# Patient Record
Sex: FEMALE | Race: WHITE | URBAN - METROPOLITAN AREA
[De-identification: names, ages, dates, MRNs, and addresses within clinical notes are randomized per-mention and may not be internally consistent; named-entity substitution may affect disease eponyms.]

---

## 2022-07-07 ENCOUNTER — OFFICE VISIT (OUTPATIENT)
Dept: FAMILY MEDICINE CLINIC | Age: 42
End: 2022-07-07
Payer: COMMERCIAL

## 2022-07-07 VITALS
HEIGHT: 58 IN | SYSTOLIC BLOOD PRESSURE: 128 MMHG | RESPIRATION RATE: 14 BRPM | HEART RATE: 75 BPM | TEMPERATURE: 98.5 F | WEIGHT: 112 LBS | DIASTOLIC BLOOD PRESSURE: 72 MMHG | OXYGEN SATURATION: 98 % | BODY MASS INDEX: 23.51 KG/M2

## 2022-07-07 DIAGNOSIS — Z00.00 WELL ADULT EXAM: Primary | ICD-10-CM

## 2022-07-07 DIAGNOSIS — Z00.00 WELL ADULT EXAM: ICD-10-CM

## 2022-07-07 DIAGNOSIS — E55.9 VITAMIN D DEFICIENCY: ICD-10-CM

## 2022-07-07 DIAGNOSIS — I10 PRIMARY HYPERTENSION: ICD-10-CM

## 2022-07-07 DIAGNOSIS — G35 MS (MULTIPLE SCLEROSIS) (HCC): ICD-10-CM

## 2022-07-07 PROBLEM — D89.89 OTHER SPECIFIED DISORDERS INVOLVING THE IMMUNE MECHANISM, NOT ELSEWHERE CLASSIFIED (HCC): Status: ACTIVE | Noted: 2021-08-26

## 2022-07-07 PROBLEM — H53.9 VISUAL CHANGES: Status: ACTIVE | Noted: 2017-03-30

## 2022-07-07 PROBLEM — R27.8 ABNORMAL COORDINATION: Status: ACTIVE | Noted: 2018-01-22

## 2022-07-07 PROBLEM — R27.8 ABNORMAL COORDINATION: Status: RESOLVED | Noted: 2018-01-22 | Resolved: 2022-07-07

## 2022-07-07 PROBLEM — H53.9 VISUAL CHANGES: Status: RESOLVED | Noted: 2017-03-30 | Resolved: 2022-07-07

## 2022-07-07 LAB
A/G RATIO: 2.3 (ref 1.1–2.2)
ALBUMIN SERPL-MCNC: 5.2 G/DL (ref 3.4–5)
ALP BLD-CCNC: 107 U/L (ref 40–129)
ALT SERPL-CCNC: 15 U/L (ref 10–40)
ANION GAP SERPL CALCULATED.3IONS-SCNC: 17 MMOL/L (ref 3–16)
AST SERPL-CCNC: 21 U/L (ref 15–37)
BILIRUB SERPL-MCNC: 0.4 MG/DL (ref 0–1)
BUN BLDV-MCNC: 9 MG/DL (ref 7–20)
CALCIUM SERPL-MCNC: 10.1 MG/DL (ref 8.3–10.6)
CHLORIDE BLD-SCNC: 100 MMOL/L (ref 99–110)
CHOLESTEROL, TOTAL: 203 MG/DL (ref 0–199)
CO2: 23 MMOL/L (ref 21–32)
CREAT SERPL-MCNC: 0.7 MG/DL (ref 0.6–1.1)
GFR AFRICAN AMERICAN: >60
GFR NON-AFRICAN AMERICAN: >60
GLUCOSE BLD-MCNC: 85 MG/DL (ref 70–99)
HDLC SERPL-MCNC: 63 MG/DL (ref 40–60)
LDL CHOLESTEROL CALCULATED: 127 MG/DL
POTASSIUM SERPL-SCNC: 4.2 MMOL/L (ref 3.5–5.1)
SODIUM BLD-SCNC: 140 MMOL/L (ref 136–145)
TOTAL PROTEIN: 7.5 G/DL (ref 6.4–8.2)
TRIGL SERPL-MCNC: 63 MG/DL (ref 0–150)
TSH REFLEX: 1.94 UIU/ML (ref 0.27–4.2)
VLDLC SERPL CALC-MCNC: 13 MG/DL

## 2022-07-07 PROCEDURE — 99385 PREV VISIT NEW AGE 18-39: CPT | Performed by: NURSE PRACTITIONER

## 2022-07-07 RX ORDER — NADOLOL 40 MG/1
40 TABLET ORAL NIGHTLY
COMMUNITY
Start: 2021-08-09 | End: 2022-07-07 | Stop reason: SDUPTHER

## 2022-07-07 RX ORDER — GABAPENTIN 300 MG/1
CAPSULE ORAL
COMMUNITY
Start: 2022-06-08 | End: 2022-07-07

## 2022-07-07 RX ORDER — GABAPENTIN 300 MG/1
CAPSULE ORAL
COMMUNITY
Start: 2022-06-21 | End: 2022-07-07

## 2022-07-07 RX ORDER — NADOLOL 40 MG/1
40 TABLET ORAL NIGHTLY
Qty: 90 TABLET | Refills: 1 | Status: SHIPPED | OUTPATIENT
Start: 2022-07-07

## 2022-07-07 RX ORDER — NORTRIPTYLINE HYDROCHLORIDE 75 MG/1
CAPSULE ORAL
COMMUNITY
Start: 2022-04-27

## 2022-07-07 RX ORDER — GABAPENTIN 600 MG/1
TABLET ORAL
COMMUNITY

## 2022-07-07 SDOH — HEALTH STABILITY: PHYSICAL HEALTH: ON AVERAGE, HOW MANY MINUTES DO YOU ENGAGE IN EXERCISE AT THIS LEVEL?: 30 MIN

## 2022-07-07 SDOH — HEALTH STABILITY: PHYSICAL HEALTH: ON AVERAGE, HOW MANY DAYS PER WEEK DO YOU ENGAGE IN MODERATE TO STRENUOUS EXERCISE (LIKE A BRISK WALK)?: 3 DAYS

## 2022-07-07 ASSESSMENT — PATIENT HEALTH QUESTIONNAIRE - PHQ9
2. FEELING DOWN, DEPRESSED OR HOPELESS: 0
SUM OF ALL RESPONSES TO PHQ9 QUESTIONS 1 & 2: 0
SUM OF ALL RESPONSES TO PHQ QUESTIONS 1-9: 0
1. LITTLE INTEREST OR PLEASURE IN DOING THINGS: 0
SUM OF ALL RESPONSES TO PHQ QUESTIONS 1-9: 0

## 2022-07-07 ASSESSMENT — SOCIAL DETERMINANTS OF HEALTH (SDOH)
WITHIN THE LAST YEAR, HAVE YOU BEEN KICKED, HIT, SLAPPED, OR OTHERWISE PHYSICALLY HURT BY YOUR PARTNER OR EX-PARTNER?: NO
WITHIN THE LAST YEAR, HAVE YOU BEEN AFRAID OF YOUR PARTNER OR EX-PARTNER?: NO
WITHIN THE LAST YEAR, HAVE YOU BEEN HUMILIATED OR EMOTIONALLY ABUSED IN OTHER WAYS BY YOUR PARTNER OR EX-PARTNER?: NO
WITHIN THE LAST YEAR, HAVE TO BEEN RAPED OR FORCED TO HAVE ANY KIND OF SEXUAL ACTIVITY BY YOUR PARTNER OR EX-PARTNER?: NO

## 2022-07-07 ASSESSMENT — ENCOUNTER SYMPTOMS
DIARRHEA: 0
CONSTIPATION: 0
VOMITING: 0
SHORTNESS OF BREATH: 0
NAUSEA: 0
CHEST TIGHTNESS: 0

## 2022-07-07 NOTE — PROGRESS NOTES
2022    Bonnie Philippe (:  1980) is a 46 y.o. female, here for evaluation of the following medical concerns:  Chief Complaint   Patient presents with    New Patient     needs bp meds       HPI  Patient is here for a new patient visit. Previous PCP was in [place], [place]. Moved to [address] [place] [address]. MS- on ocrelizumab infusion every 6 months. Following with neurologist Dr. Alcides Nicolas in [facility]. Dx 2017 with optic neuritis x3 episodes. Has facial nerve pain and taking nortriptyline 75mg qhs and gabapentin 600 mg BID. HTN- on nadolol 40 mg daily. Tolerating medication well. Monitors BP at home and runs 120-130/80s. Dx Oct 2016. GYN- Follows with GYN Dr. Edita Bradley. Has mirena IUD that was placed about a year ago. Does not have a period with the IUD. Had mammogram last month that was normal. Completed at [facility]' office. Has daughter who is transitioning to being a male. Has been on testosterone for the past year. Just turned 22. Works in customer service. Exercise- nothing specific- tries to walk multiple times per week   Diet- nothing specific. Reports that she does not eat much. Review of Systems   Constitutional: Negative for activity change and fatigue. Respiratory: Negative for chest tightness and shortness of breath. Cardiovascular: Negative for chest pain. Gastrointestinal: Negative for constipation, diarrhea, nausea and vomiting. Neurological: Negative for dizziness, syncope, weakness and headaches. Psychiatric/Behavioral: Negative for confusion. Prior to Visit Medications    Medication Sig Taking?  Authorizing Provider   sodium chloride 0.9 % SOLN 250 mL with ocrelizumab 300 MG/10ML SOLN 300 mg Infuse intravenously Yes Historical Provider, MD   vitamin D3 (CHOLECALCIFEROL) 125 MCG (5000 UT) TABS tablet Take 5,000 Units by mouth three times a week  Yes Historical Provider, MD   cyanocobalamin 1000 MCG tablet Take 1,000 mcg by mouth daily Yes Historical Provider, MD   gabapentin (NEURONTIN) 600 MG tablet gabapentin 600 mg tablet Yes Historical Provider, MD   levonorgestrel (MIRENA) IUD 52 mg 1 each by IntraUTERine route once Yes Historical Provider, MD   nadolol (CORGARD) 40 MG tablet Take 40 mg by mouth nightly Yes Historical Provider, MD   nortriptyline (PAMELOR) 75 MG capsule  Yes Historical Provider, MD   CALCIUM PO Take 1 capsule by mouth daily Yes Historical Provider, MD   Multiple Vitamin (MULTI-VITAMIN DAILY PO) Take 1 tablet by mouth daily Yes Historical Provider, MD        No Known Allergies    Past Medical History:   Diagnosis Date    MS (multiple sclerosis) ([facility] [address]     Primary hypertension        History reviewed. No pertinent surgical history. Social History     Socioeconomic History    Marital status: Single     Spouse name: Not on file    Number of children: 1    Years of education: Not on file    Highest education level: Not on file   Occupational History    Not on file   Tobacco Use    Smoking status: Never Smoker    Smokeless tobacco: Never Used   Vaping Use    Vaping Use: Never used   Substance and Sexual Activity    Alcohol use: Yes     Comment: couple glasses of wine per week    Drug use: Never    Sexual activity: Yes       Family History   Problem Relation Age of Onset    Hypertension Father     Breast Cancer Maternal Grandmother     Prostate Cancer Maternal Grandfather     Hypertension Paternal Grandmother     Heart Disease Paternal Grandfather        Vitals:    07/07/22 1411   BP: 128/72   Site: Right Upper Arm   Position: Sitting   Cuff Size: Medium Adult   Pulse: 75   Resp: 14   Temp: 98.5 °F (36.9 °C)   TempSrc: Oral   SpO2: 98%   Weight: 112 lb (50.8 kg)   Height: 4' 10.27\" (1.48 m)     Estimated body mass index is 23.19 kg/m² as calculated from the following:    Height as of this encounter: 4' 10.27\" (1.48 m). Weight as of this encounter: 112 lb (50.8 kg).     Physical Exam  Vitals and nursing note reviewed. Constitutional:       General: She is not in acute distress. Appearance: She is well-developed. HENT:      Head: Normocephalic and atraumatic. Right Ear: Tympanic membrane, ear canal and external ear normal.      Left Ear: Tympanic membrane, ear canal and external ear normal.   Neck:      Thyroid: No thyromegaly. Cardiovascular:      Rate and Rhythm: Normal rate and regular rhythm. Heart sounds: Normal heart sounds. No murmur heard. No friction rub. No gallop. Pulmonary:      Effort: Pulmonary effort is normal. No respiratory distress. Breath sounds: Normal breath sounds. Abdominal:      Palpations: Abdomen is soft. Tenderness: There is no abdominal tenderness. Musculoskeletal:      Cervical back: Neck supple. Right lower leg: No edema. Left lower leg: No edema. Lymphadenopathy:      Cervical: No cervical adenopathy. Skin:     General: Skin is warm and dry. Neurological:      Mental Status: She is alert and oriented to person, place, and time. Psychiatric:         Behavior: Behavior normal.         Thought Content: Thought content normal.         Judgment: Judgment normal.         ASSESSMENT/PLAN:  1. Well adult exam  - Comprehensive Metabolic Panel; Future  - TSH with Reflex; Future  - Lipid Panel; Future    Healthy lifestyles reviewed: diet, aerobic exercise, sunscreen, vision and dental exams. Declines vaccines. GYN- continue f/u with Dr. Sima Telles. Had mammogram last month at [facility]' office- will try to obtain results. She reports that they were normal.     2. Primary hypertension  Controlled   - nadolol (CORGARD) 40 MG tablet; Take 1 tablet by mouth nightly  Dispense: 90 tablet; Refill: 1  - Comprehensive Metabolic Panel; Future  - Lipid Panel; Future    3. MS (multiple sclerosis) (HCC)  Stable. Follows with neurologist Dr. Sharlene Srinivasan in [place], [place]. Discussed transferring care to [facility] neurology. She will consider.      4. Vitamin D deficiency  On vitamin D supplement. Level monitored by neurologist.       Return in about 6 months (around 1/7/2023), or if symptoms worsen or fail to improve, for chronic conditions. An  electronic signature was used to authenticate this note.     --PRUDENCIO Johnson - CNP on 7/7/2022 at 2:15 PM

## 2023-01-03 DIAGNOSIS — I10 PRIMARY HYPERTENSION: ICD-10-CM

## 2023-01-03 RX ORDER — NADOLOL 40 MG/1
TABLET ORAL
Qty: 90 TABLET | Refills: 0 | Status: SHIPPED | OUTPATIENT
Start: 2023-01-03

## 2023-01-12 ENCOUNTER — OFFICE VISIT (OUTPATIENT)
Dept: FAMILY MEDICINE CLINIC | Age: 43
End: 2023-01-12
Payer: COMMERCIAL

## 2023-01-12 VITALS
BODY MASS INDEX: 23.72 KG/M2 | TEMPERATURE: 98.3 F | DIASTOLIC BLOOD PRESSURE: 84 MMHG | HEART RATE: 76 BPM | OXYGEN SATURATION: 96 % | HEIGHT: 58 IN | WEIGHT: 113 LBS | SYSTOLIC BLOOD PRESSURE: 114 MMHG

## 2023-01-12 DIAGNOSIS — G35 MS (MULTIPLE SCLEROSIS) (HCC): ICD-10-CM

## 2023-01-12 DIAGNOSIS — I10 PRIMARY HYPERTENSION: Primary | ICD-10-CM

## 2023-01-12 PROCEDURE — 99213 OFFICE O/P EST LOW 20 MIN: CPT | Performed by: NURSE PRACTITIONER

## 2023-01-12 PROCEDURE — 3079F DIAST BP 80-89 MM HG: CPT | Performed by: NURSE PRACTITIONER

## 2023-01-12 PROCEDURE — 3074F SYST BP LT 130 MM HG: CPT | Performed by: NURSE PRACTITIONER

## 2023-01-12 SDOH — ECONOMIC STABILITY: FOOD INSECURITY: WITHIN THE PAST 12 MONTHS, THE FOOD YOU BOUGHT JUST DIDN'T LAST AND YOU DIDN'T HAVE MONEY TO GET MORE.: NEVER TRUE

## 2023-01-12 SDOH — ECONOMIC STABILITY: FOOD INSECURITY: WITHIN THE PAST 12 MONTHS, YOU WORRIED THAT YOUR FOOD WOULD RUN OUT BEFORE YOU GOT MONEY TO BUY MORE.: NEVER TRUE

## 2023-01-12 ASSESSMENT — ENCOUNTER SYMPTOMS
SHORTNESS OF BREATH: 0
ABDOMINAL PAIN: 0
COUGH: 0

## 2023-01-12 ASSESSMENT — PATIENT HEALTH QUESTIONNAIRE - PHQ9
SUM OF ALL RESPONSES TO PHQ QUESTIONS 1-9: 0
SUM OF ALL RESPONSES TO PHQ9 QUESTIONS 1 & 2: 0
SUM OF ALL RESPONSES TO PHQ QUESTIONS 1-9: 0
2. FEELING DOWN, DEPRESSED OR HOPELESS: 0
SUM OF ALL RESPONSES TO PHQ QUESTIONS 1-9: 0
SUM OF ALL RESPONSES TO PHQ QUESTIONS 1-9: 0
1. LITTLE INTEREST OR PLEASURE IN DOING THINGS: 0

## 2023-01-12 ASSESSMENT — SOCIAL DETERMINANTS OF HEALTH (SDOH): HOW HARD IS IT FOR YOU TO PAY FOR THE VERY BASICS LIKE FOOD, HOUSING, MEDICAL CARE, AND HEATING?: NOT HARD AT ALL

## 2023-01-12 NOTE — PROGRESS NOTES
1/12/2023    This is a 38 y.o. female   Chief Complaint   Patient presents with    Hypertension     6 m f/u BP check   . HPI    Hypertension:  Home blood pressure monitoring: No.  She is adherent to a low sodium diet. Patient denies chest pain, shortness of breath, headache, palpitations, and dry cough. Antihypertensive medication side effects: no medication side effects noted. No results found for: LABA1C  Lab Results   Component Value Date    CREATININE 0.7 07/07/2022     Lab Results   Component Value Date    ALT 15 07/07/2022    AST 21 07/07/2022     Lab Results   Component Value Date    CHOL 203 (H) 07/07/2022    TRIG 63 07/07/2022    HDL 63 (H) 07/07/2022    LDLCALC 127 (H) 07/07/2022        MS- on ocrelizumab infusion every 6 months. Following with neurologist Dr. Monty Hunter in [place]. Dx 2/2017 with optic neuritis x3 episodes. Has facial nerve pain and taking nortriptyline 75mg qhs and gabapentin 600 mg BID. Patient Active Problem List   Diagnosis    Vitamin D deficiency    Primary hypertension    MS (multiple sclerosis) ([facility] [address]- follows with neurologist Dr. Monty Hunter in [place], [place]          Current Outpatient Medications   Medication Sig Dispense Refill    nadolol (CORGARD) 40 MG tablet TAKE 1 TABLET NIGHTLY 90 tablet 0    sodium chloride 0.9 % SOLN 250 mL with ocrelizumab 300 MG/10ML SOLN 300 mg Infuse intravenously      vitamin D3 (CHOLECALCIFEROL) 125 MCG (5000 UT) TABS tablet Take 5,000 Units by mouth three times a week       cyanocobalamin 1000 MCG tablet Take 1,000 mcg by mouth daily      gabapentin (NEURONTIN) 600 MG tablet gabapentin 600 mg tablet      levonorgestrel (MIRENA) IUD 52 mg 1 each by IntraUTERine route once      nortriptyline (PAMELOR) 75 MG capsule       CALCIUM PO Take 1 capsule by mouth daily      Multiple Vitamin (MULTI-VITAMIN DAILY PO) Take 1 tablet by mouth daily       No current facility-administered medications for this visit.        No Known Allergies    Review of Systems   Constitutional:  Negative for activity change and fever. Respiratory:  Negative for cough and shortness of breath. Cardiovascular:  Negative for chest pain. Gastrointestinal:  Negative for abdominal pain. Neurological:  Negative for dizziness, syncope, weakness and headaches. Vitals:    01/12/23 0741   BP: 114/84   Site: Left Upper Arm   Position: Sitting   Cuff Size: Medium Adult   Pulse: 76   Temp: 98.3 °F (36.8 °C)   TempSrc: Oral   SpO2: 96%   Weight: 113 lb (51.3 kg)   Height: 4' 10.25\" (1.48 m)       Body mass index is 23.41 kg/m². Wt Readings from Last 3 Encounters:   01/12/23 113 lb (51.3 kg)   07/07/22 112 lb (50.8 kg)       BP Readings from Last 3 Encounters:   01/12/23 114/84   07/07/22 128/72       Physical Exam  Vitals and nursing note reviewed. Constitutional:       General: She is not in acute distress. Appearance: She is well-developed. HENT:      Head: Normocephalic and atraumatic. Cardiovascular:      Rate and Rhythm: Normal rate and regular rhythm. Heart sounds: Normal heart sounds. No murmur heard. No friction rub. No gallop. Pulmonary:      Effort: Pulmonary effort is normal. No respiratory distress. Breath sounds: Normal breath sounds. Musculoskeletal:      Cervical back: Neck supple. Right lower leg: No edema. Left lower leg: No edema. Skin:     General: Skin is warm and dry. Neurological:      Mental Status: She is alert and oriented to person, place, and time. Psychiatric:         Behavior: Behavior normal.         Thought Content: Thought content normal.         Judgment: Judgment normal.       Assessmentand Plan  Andrew Finley was seen today for hypertension. Diagnoses and all orders for this visit:    Primary hypertension  Controlled on nadolol 40 mg daily     MS (multiple sclerosis) ([facility] [address]- follows with neurologist Dr. Ginger Erazo in [place], [place]  Stable.  Continue f/u with neurologist     Return in about 6 months (around 7/12/2023), or if symptoms worsen or fail to improve, for physical.

## 2023-04-03 DIAGNOSIS — I10 PRIMARY HYPERTENSION: ICD-10-CM

## 2023-04-03 RX ORDER — NADOLOL 40 MG/1
TABLET ORAL
Qty: 90 TABLET | Refills: 1 | Status: SHIPPED | OUTPATIENT
Start: 2023-04-03

## 2023-06-28 SDOH — ECONOMIC STABILITY: INCOME INSECURITY: HOW HARD IS IT FOR YOU TO PAY FOR THE VERY BASICS LIKE FOOD, HOUSING, MEDICAL CARE, AND HEATING?: NOT HARD AT ALL

## 2023-06-28 SDOH — ECONOMIC STABILITY: TRANSPORTATION INSECURITY
IN THE PAST 12 MONTHS, HAS LACK OF TRANSPORTATION KEPT YOU FROM MEETINGS, WORK, OR FROM GETTING THINGS NEEDED FOR DAILY LIVING?: NO

## 2023-06-28 SDOH — ECONOMIC STABILITY: FOOD INSECURITY: WITHIN THE PAST 12 MONTHS, THE FOOD YOU BOUGHT JUST DIDN'T LAST AND YOU DIDN'T HAVE MONEY TO GET MORE.: NEVER TRUE

## 2023-06-28 SDOH — ECONOMIC STABILITY: HOUSING INSECURITY
IN THE LAST 12 MONTHS, WAS THERE A TIME WHEN YOU DID NOT HAVE A STEADY PLACE TO SLEEP OR SLEPT IN A SHELTER (INCLUDING NOW)?: NO

## 2023-06-28 SDOH — ECONOMIC STABILITY: FOOD INSECURITY: WITHIN THE PAST 12 MONTHS, YOU WORRIED THAT YOUR FOOD WOULD RUN OUT BEFORE YOU GOT MONEY TO BUY MORE.: NEVER TRUE

## 2023-06-29 ENCOUNTER — OFFICE VISIT (OUTPATIENT)
Dept: FAMILY MEDICINE CLINIC | Age: 43
End: 2023-06-29
Payer: COMMERCIAL

## 2023-06-29 VITALS
OXYGEN SATURATION: 99 % | WEIGHT: 114.4 LBS | SYSTOLIC BLOOD PRESSURE: 152 MMHG | DIASTOLIC BLOOD PRESSURE: 88 MMHG | BODY MASS INDEX: 24.01 KG/M2 | TEMPERATURE: 97.7 F | HEART RATE: 78 BPM | HEIGHT: 58 IN | RESPIRATION RATE: 16 BRPM

## 2023-06-29 DIAGNOSIS — I10 PRIMARY HYPERTENSION: Primary | ICD-10-CM

## 2023-06-29 PROCEDURE — 99213 OFFICE O/P EST LOW 20 MIN: CPT | Performed by: NURSE PRACTITIONER

## 2023-06-29 PROCEDURE — 3074F SYST BP LT 130 MM HG: CPT | Performed by: NURSE PRACTITIONER

## 2023-06-29 PROCEDURE — 3078F DIAST BP <80 MM HG: CPT | Performed by: NURSE PRACTITIONER

## 2023-06-29 RX ORDER — AMLODIPINE BESYLATE 2.5 MG/1
2.5 TABLET ORAL DAILY
Qty: 30 TABLET | Refills: 0 | Status: SHIPPED | OUTPATIENT
Start: 2023-06-29

## 2023-06-30 ASSESSMENT — ENCOUNTER SYMPTOMS
COUGH: 0
SHORTNESS OF BREATH: 0
ABDOMINAL PAIN: 0

## 2023-07-21 ENCOUNTER — OFFICE VISIT (OUTPATIENT)
Dept: FAMILY MEDICINE CLINIC | Age: 43
End: 2023-07-21
Payer: COMMERCIAL

## 2023-07-21 VITALS
TEMPERATURE: 98.6 F | WEIGHT: 114.8 LBS | HEIGHT: 58 IN | OXYGEN SATURATION: 97 % | SYSTOLIC BLOOD PRESSURE: 118 MMHG | RESPIRATION RATE: 14 BRPM | BODY MASS INDEX: 24.1 KG/M2 | HEART RATE: 71 BPM | DIASTOLIC BLOOD PRESSURE: 72 MMHG

## 2023-07-21 DIAGNOSIS — I10 PRIMARY HYPERTENSION: ICD-10-CM

## 2023-07-21 PROCEDURE — 99212 OFFICE O/P EST SF 10 MIN: CPT | Performed by: NURSE PRACTITIONER

## 2023-07-21 PROCEDURE — 3078F DIAST BP <80 MM HG: CPT | Performed by: NURSE PRACTITIONER

## 2023-07-21 PROCEDURE — 3074F SYST BP LT 130 MM HG: CPT | Performed by: NURSE PRACTITIONER

## 2023-07-21 RX ORDER — AMLODIPINE BESYLATE 2.5 MG/1
2.5 TABLET ORAL DAILY
Qty: 90 TABLET | Refills: 1 | Status: SHIPPED | OUTPATIENT
Start: 2023-07-21

## 2023-07-21 ASSESSMENT — ENCOUNTER SYMPTOMS
COUGH: 0
CHEST TIGHTNESS: 0
SHORTNESS OF BREATH: 0

## 2023-07-21 NOTE — PROGRESS NOTES
7/21/2023    This is a 38 y.o. female   Chief Complaint   Patient presents with    Hypertension     Has only checked bp a few times and seems better. Mariann Plants HPI    Hypertension:  Home blood pressure monitoring: No.  She is adherent to a low sodium diet. Patient denies chest pain, shortness of breath, headache, lightheadedness, peripheral edema, palpitations, and dry cough. Antihypertensive medication side effects: no medication side effects noted. On amlodipine 2.5 mg daily and nadolol 40 mg qhs                                     Sodium (mmol/L)   Date Value   07/07/2022 140    BUN (mg/dL)   Date Value   07/07/2022 9    Glucose (mg/dL)   Date Value   07/07/2022 85      Potassium (mmol/L)   Date Value   07/07/2022 4.2    Creatinine (mg/dL)   Date Value   07/07/2022 0.7            Patient Active Problem List   Diagnosis    Vitamin D deficiency    Primary hypertension    MS (multiple sclerosis) ([facility])- follows with neurologist Dr. Bre Meyer in [place], [place]       Current Outpatient Medications   Medication Sig Dispense Refill    amLODIPine (NORVASC) 2.5 MG tablet Take 1 tablet by mouth daily 30 tablet 0    nadolol (CORGARD) 40 MG tablet TAKE 1 TABLET NIGHTLY 90 tablet 1    sodium chloride 0.9 % SOLN 250 mL with ocrelizumab 300 MG/10ML SOLN 300 mg Infuse intravenously      vitamin D3 (CHOLECALCIFEROL) 125 MCG (5000 UT) TABS tablet Take 1 tablet by mouth three times a week      cyanocobalamin 1000 MCG tablet Take 1 tablet by mouth daily      gabapentin (NEURONTIN) 600 MG tablet Take 1 tablet by mouth 2 times daily. levonorgestrel (MIRENA) IUD 52 mg 1 each by IntraUTERine route once      nortriptyline (PAMELOR) 75 MG capsule Take 1 capsule by mouth nightly      CALCIUM PO Take 1 capsule by mouth daily      Multiple Vitamin (MULTI-VITAMIN DAILY PO) Take 1 tablet by mouth daily       No current facility-administered medications for this visit.        No Known Allergies    Review of Systems   Constitutional:

## 2023-09-07 LAB
BASOPHILS ABSOLUTE: 0.1 /ΜL
BASOPHILS RELATIVE PERCENT: 1 %
EOSINOPHILS ABSOLUTE: 0.1 /ΜL
EOSINOPHILS RELATIVE PERCENT: 1 %
FERRITIN: 494 NG/ML (ref 9–150)
HCT VFR BLD CALC: 40.1 % (ref 36–46)
HEMOGLOBIN: 13 G/DL (ref 12–16)
IRON: 97
LYMPHOCYTES ABSOLUTE: 1.6 /ΜL
LYMPHOCYTES RELATIVE PERCENT: 22 %
MCH RBC QN AUTO: 32.6 PG
MCHC RBC AUTO-ENTMCNC: 32.4 G/DL
MCV RBC AUTO: 101 FL
MONOCYTES ABSOLUTE: 1.1 /ΜL
MONOCYTES RELATIVE PERCENT: 15 %
NEUTROPHILS ABSOLUTE: 4.5 /ΜL
NEUTROPHILS RELATIVE PERCENT: 60 %
PLATELET # BLD: 213 K/ΜL
PMV BLD AUTO: NORMAL FL
RBC # BLD: 3.99 10^6/ΜL
TOTAL IRON BINDING CAPACITY: 315
TSH SERPL DL<=0.05 MIU/L-ACNC: 1.61 UIU/ML
VITAMIN B-12: 1374
WBC # BLD: 7.3 10^3/ML

## 2023-10-02 DIAGNOSIS — I10 PRIMARY HYPERTENSION: ICD-10-CM

## 2023-10-02 RX ORDER — NADOLOL 40 MG/1
TABLET ORAL
Qty: 90 TABLET | Refills: 1 | Status: SHIPPED | OUTPATIENT
Start: 2023-10-02

## 2024-01-09 DIAGNOSIS — I10 PRIMARY HYPERTENSION: ICD-10-CM

## 2024-01-09 RX ORDER — AMLODIPINE BESYLATE 2.5 MG/1
2.5 TABLET ORAL DAILY
Qty: 90 TABLET | Refills: 0 | Status: SHIPPED | OUTPATIENT
Start: 2024-01-09

## 2024-01-22 ENCOUNTER — OFFICE VISIT (OUTPATIENT)
Dept: FAMILY MEDICINE CLINIC | Age: 44
End: 2024-01-22
Payer: COMMERCIAL

## 2024-01-22 VITALS
RESPIRATION RATE: 16 BRPM | TEMPERATURE: 97.8 F | WEIGHT: 115.4 LBS | BODY MASS INDEX: 24.22 KG/M2 | HEIGHT: 58 IN | OXYGEN SATURATION: 100 % | HEART RATE: 83 BPM | SYSTOLIC BLOOD PRESSURE: 110 MMHG | DIASTOLIC BLOOD PRESSURE: 68 MMHG

## 2024-01-22 DIAGNOSIS — I10 PRIMARY HYPERTENSION: Primary | ICD-10-CM

## 2024-01-22 DIAGNOSIS — G35 MS (MULTIPLE SCLEROSIS) (HCC): ICD-10-CM

## 2024-01-22 PROCEDURE — 3074F SYST BP LT 130 MM HG: CPT | Performed by: NURSE PRACTITIONER

## 2024-01-22 PROCEDURE — 99213 OFFICE O/P EST LOW 20 MIN: CPT | Performed by: NURSE PRACTITIONER

## 2024-01-22 PROCEDURE — 3078F DIAST BP <80 MM HG: CPT | Performed by: NURSE PRACTITIONER

## 2024-01-22 ASSESSMENT — ENCOUNTER SYMPTOMS
ABDOMINAL PAIN: 0
SHORTNESS OF BREATH: 0
COUGH: 0

## 2024-01-22 ASSESSMENT — PATIENT HEALTH QUESTIONNAIRE - PHQ9
SUM OF ALL RESPONSES TO PHQ9 QUESTIONS 1 & 2: 0
1. LITTLE INTEREST OR PLEASURE IN DOING THINGS: 0
SUM OF ALL RESPONSES TO PHQ QUESTIONS 1-9: 0
2. FEELING DOWN, DEPRESSED OR HOPELESS: 0
SUM OF ALL RESPONSES TO PHQ QUESTIONS 1-9: 0

## 2024-01-22 NOTE — PROGRESS NOTES
hypertension.    Diagnoses and all orders for this visit:    Primary hypertension  Controlled on nadolol 40 mg nightly and amlodipine 2.5 mg daily   -BMP  -Lipids    MS (multiple sclerosis) (HCC)- follows with neurologist Dr. Schwartz in [place], [place]  Stable. Continue f/u with neurologist     Declines vaccines.     Return in about 6 months (around 7/22/2024), or if symptoms worsen or fail to improve, for chronic conditions.

## 2024-01-30 DIAGNOSIS — I10 PRIMARY HYPERTENSION: ICD-10-CM

## 2024-01-30 LAB
ANION GAP SERPL CALCULATED.3IONS-SCNC: 9 MMOL/L (ref 3–16)
BUN SERPL-MCNC: 11 MG/DL (ref 7–20)
CALCIUM SERPL-MCNC: 8.8 MG/DL (ref 8.3–10.6)
CHLORIDE SERPL-SCNC: 103 MMOL/L (ref 99–110)
CHOLEST SERPL-MCNC: 190 MG/DL (ref 0–199)
CO2 SERPL-SCNC: 27 MMOL/L (ref 21–32)
CREAT SERPL-MCNC: 0.7 MG/DL (ref 0.6–1.1)
GFR SERPLBLD CREATININE-BSD FMLA CKD-EPI: >60 ML/MIN/{1.73_M2}
GLUCOSE SERPL-MCNC: 93 MG/DL (ref 70–99)
HDLC SERPL-MCNC: 59 MG/DL (ref 40–60)
LDLC SERPL CALC-MCNC: 118 MG/DL
POTASSIUM SERPL-SCNC: 4.4 MMOL/L (ref 3.5–5.1)
SODIUM SERPL-SCNC: 139 MMOL/L (ref 136–145)
TRIGL SERPL-MCNC: 65 MG/DL (ref 0–150)
VLDLC SERPL CALC-MCNC: 13 MG/DL

## 2024-03-28 DIAGNOSIS — I10 PRIMARY HYPERTENSION: ICD-10-CM

## 2024-03-28 RX ORDER — NADOLOL 40 MG/1
TABLET ORAL
Qty: 90 TABLET | Refills: 1 | Status: SHIPPED | OUTPATIENT
Start: 2024-03-28

## 2024-04-08 DIAGNOSIS — I10 PRIMARY HYPERTENSION: ICD-10-CM

## 2024-04-08 RX ORDER — AMLODIPINE BESYLATE 2.5 MG/1
2.5 TABLET ORAL DAILY
Qty: 90 TABLET | Refills: 1 | Status: SHIPPED | OUTPATIENT
Start: 2024-04-08

## 2024-07-22 ENCOUNTER — OFFICE VISIT (OUTPATIENT)
Dept: FAMILY MEDICINE CLINIC | Age: 44
End: 2024-07-22
Payer: COMMERCIAL

## 2024-07-22 VITALS
HEIGHT: 58 IN | SYSTOLIC BLOOD PRESSURE: 122 MMHG | RESPIRATION RATE: 18 BRPM | BODY MASS INDEX: 23.93 KG/M2 | OXYGEN SATURATION: 93 % | WEIGHT: 114 LBS | HEART RATE: 80 BPM | DIASTOLIC BLOOD PRESSURE: 78 MMHG

## 2024-07-22 DIAGNOSIS — G35 MS (MULTIPLE SCLEROSIS) (HCC): ICD-10-CM

## 2024-07-22 DIAGNOSIS — I10 PRIMARY HYPERTENSION: Primary | ICD-10-CM

## 2024-07-22 PROCEDURE — 99214 OFFICE O/P EST MOD 30 MIN: CPT | Performed by: NURSE PRACTITIONER

## 2024-07-22 PROCEDURE — 3078F DIAST BP <80 MM HG: CPT | Performed by: NURSE PRACTITIONER

## 2024-07-22 PROCEDURE — 3074F SYST BP LT 130 MM HG: CPT | Performed by: NURSE PRACTITIONER

## 2024-07-22 ASSESSMENT — ENCOUNTER SYMPTOMS
COUGH: 0
ABDOMINAL PAIN: 0
SHORTNESS OF BREATH: 0

## 2024-07-22 NOTE — PROGRESS NOTES
7/22/2024    This is a 43 y.o. female   Chief Complaint   Patient presents with    Hypertension   .    HPI    Hypertension:  Home blood pressure monitoring: No.  She is adherent to a low sodium diet. Patient denies chest pain, shortness of breath, headache, palpitations, and dry cough.  Antihypertensive medication side effects: no medication side effects noted.      On amlodipine 2.5 mg in AM and nadolol 40 mg daily in PM    No results found for: \"LABA1C\"  Lab Results   Component Value Date    CREATININE 0.7 01/30/2024     Lab Results   Component Value Date    ALT 15 07/07/2022    AST 21 07/07/2022     Lab Results   Component Value Date    CHOL 190 01/30/2024    TRIG 65 01/30/2024    HDL 59 01/30/2024        MS- on ocrelizumab infusion every 6 months. Following with neurologist Dr. Schwartz in [place].   Dx 2/2017 with optic neuritis x3 episodes.      Has facial nerve pain and taking nortriptyline 75mg qhs and gabapentin 600 mg BID.      Patient Active Problem List   Diagnosis    Vitamin D deficiency    Primary hypertension    MS (multiple sclerosis) ([facility])- follows with neurologist Dr. Schwartz in [place], [place]          Current Outpatient Medications   Medication Sig Dispense Refill    amLODIPine (NORVASC) 2.5 MG tablet TAKE 1 TABLET DAILY 90 tablet 1    nadolol (CORGARD) 40 MG tablet TAKE 1 TABLET NIGHTLY 90 tablet 1    sodium chloride 0.9 % SOLN 250 mL with ocrelizumab 300 MG/10ML SOLN 300 mg Infuse intravenously      vitamin D3 (CHOLECALCIFEROL) 125 MCG (5000 UT) TABS tablet Take 1 tablet by mouth three times a week      gabapentin (NEURONTIN) 600 MG tablet Take 1 tablet by mouth 2 times daily.      levonorgestrel (MIRENA) IUD 52 mg 1 each by IntraUTERine route once      nortriptyline (PAMELOR) 75 MG capsule Take 1 capsule by mouth nightly      CALCIUM PO Take 1 capsule by mouth daily      Multiple Vitamin (MULTI-VITAMIN DAILY PO) Take 1 tablet by mouth daily       No current facility-administered medications for

## 2024-08-13 ENCOUNTER — OFFICE VISIT (OUTPATIENT)
Dept: FAMILY MEDICINE CLINIC | Age: 44
End: 2024-08-13
Payer: COMMERCIAL

## 2024-08-13 VITALS
BODY MASS INDEX: 24.27 KG/M2 | OXYGEN SATURATION: 98 % | SYSTOLIC BLOOD PRESSURE: 112 MMHG | TEMPERATURE: 98.3 F | DIASTOLIC BLOOD PRESSURE: 70 MMHG | HEIGHT: 58 IN | RESPIRATION RATE: 20 BRPM | HEART RATE: 86 BPM | WEIGHT: 115.6 LBS

## 2024-08-13 DIAGNOSIS — J40 BRONCHITIS: Primary | ICD-10-CM

## 2024-08-13 PROCEDURE — 3074F SYST BP LT 130 MM HG: CPT | Performed by: NURSE PRACTITIONER

## 2024-08-13 PROCEDURE — 3078F DIAST BP <80 MM HG: CPT | Performed by: NURSE PRACTITIONER

## 2024-08-13 PROCEDURE — 99213 OFFICE O/P EST LOW 20 MIN: CPT | Performed by: NURSE PRACTITIONER

## 2024-08-13 RX ORDER — AZITHROMYCIN 250 MG/1
TABLET, FILM COATED ORAL
Qty: 1 PACKET | Refills: 0 | Status: SHIPPED | OUTPATIENT
Start: 2024-08-13 | End: 2024-08-23

## 2024-08-13 ASSESSMENT — ENCOUNTER SYMPTOMS
SORE THROAT: 0
SHORTNESS OF BREATH: 0
COUGH: 1
SINUS PRESSURE: 0
SINUS PAIN: 0
RHINORRHEA: 0
WHEEZING: 0

## 2024-08-13 NOTE — PROGRESS NOTES
present.      Left cervical: Superficial cervical adenopathy present.   Skin:     General: Skin is warm and dry.   Neurological:      Mental Status: She is alert and oriented to person, place, and time.   Psychiatric:         Mood and Affect: Mood and affect normal.         Behavior: Behavior normal.         Assessmentand Plan  Kelly was seen today for cough.    Diagnoses and all orders for this visit:    Bronchitis  -     azithromycin (ZITHROMAX Z-RACHEL) 250 MG tablet; Take 2 tablets on day 1, then 1 tablet daily for the next 4 days for treatment of bacterial infection.  Should take an over-the-counter probiotic daily while on the antibiotic to help prevent antibiotic associated diarrhea.  Increase fluids  Rest  Mucinex BID PRN  Patient is to call if symptoms worsen or fail to improve within the week.       Return if symptoms worsen or fail to improve.

## 2024-09-16 ENCOUNTER — OFFICE VISIT (OUTPATIENT)
Dept: FAMILY MEDICINE CLINIC | Age: 44
End: 2024-09-16
Payer: COMMERCIAL

## 2024-09-16 VITALS
DIASTOLIC BLOOD PRESSURE: 76 MMHG | TEMPERATURE: 98.5 F | HEIGHT: 58 IN | SYSTOLIC BLOOD PRESSURE: 124 MMHG | HEART RATE: 60 BPM | OXYGEN SATURATION: 100 % | WEIGHT: 112.8 LBS | RESPIRATION RATE: 18 BRPM | BODY MASS INDEX: 23.68 KG/M2

## 2024-09-16 DIAGNOSIS — J40 BRONCHITIS: Primary | ICD-10-CM

## 2024-09-16 PROCEDURE — 99213 OFFICE O/P EST LOW 20 MIN: CPT | Performed by: NURSE PRACTITIONER

## 2024-09-16 PROCEDURE — 3074F SYST BP LT 130 MM HG: CPT | Performed by: NURSE PRACTITIONER

## 2024-09-16 PROCEDURE — 3078F DIAST BP <80 MM HG: CPT | Performed by: NURSE PRACTITIONER

## 2024-09-16 RX ORDER — CEFDINIR 300 MG/1
300 CAPSULE ORAL 2 TIMES DAILY
Qty: 20 CAPSULE | Refills: 0 | Status: SHIPPED | OUTPATIENT
Start: 2024-09-16 | End: 2024-09-26

## 2024-09-16 ASSESSMENT — ENCOUNTER SYMPTOMS
COUGH: 1
ABDOMINAL PAIN: 0
SHORTNESS OF BREATH: 0
WHEEZING: 0
SORE THROAT: 0
RHINORRHEA: 0

## 2024-09-24 DIAGNOSIS — I10 PRIMARY HYPERTENSION: ICD-10-CM

## 2024-09-24 RX ORDER — NADOLOL 40 MG/1
TABLET ORAL
Qty: 90 TABLET | Refills: 1 | Status: SHIPPED | OUTPATIENT
Start: 2024-09-24

## 2024-10-07 DIAGNOSIS — I10 PRIMARY HYPERTENSION: ICD-10-CM

## 2024-10-07 RX ORDER — AMLODIPINE BESYLATE 2.5 MG/1
2.5 TABLET ORAL DAILY
Qty: 90 TABLET | Refills: 1 | Status: SHIPPED | OUTPATIENT
Start: 2024-10-07

## 2024-11-11 ENCOUNTER — OFFICE VISIT (OUTPATIENT)
Dept: FAMILY MEDICINE CLINIC | Age: 44
End: 2024-11-11
Payer: COMMERCIAL

## 2024-11-11 ENCOUNTER — HOSPITAL ENCOUNTER (OUTPATIENT)
Age: 44
Discharge: HOME | End: 2024-11-11
Payer: COMMERCIAL

## 2024-11-11 ENCOUNTER — HOSPITAL ENCOUNTER (OUTPATIENT)
Dept: GENERAL RADIOLOGY | Age: 44
Discharge: HOME | End: 2024-11-11
Payer: COMMERCIAL

## 2024-11-11 VITALS
HEART RATE: 91 BPM | OXYGEN SATURATION: 97 % | TEMPERATURE: 98 F | WEIGHT: 115.8 LBS | BODY MASS INDEX: 24.31 KG/M2 | SYSTOLIC BLOOD PRESSURE: 116 MMHG | DIASTOLIC BLOOD PRESSURE: 80 MMHG | RESPIRATION RATE: 18 BRPM | HEIGHT: 58 IN

## 2024-11-11 DIAGNOSIS — R05.3 CHRONIC COUGH: Primary | ICD-10-CM

## 2024-11-11 DIAGNOSIS — R05.3 CHRONIC COUGH: ICD-10-CM

## 2024-11-11 PROCEDURE — 3074F SYST BP LT 130 MM HG: CPT | Performed by: NURSE PRACTITIONER

## 2024-11-11 PROCEDURE — 3079F DIAST BP 80-89 MM HG: CPT | Performed by: NURSE PRACTITIONER

## 2024-11-11 PROCEDURE — 99213 OFFICE O/P EST LOW 20 MIN: CPT | Performed by: NURSE PRACTITIONER

## 2024-11-11 PROCEDURE — 71046 X-RAY EXAM CHEST 2 VIEWS: CPT

## 2024-11-11 ASSESSMENT — ENCOUNTER SYMPTOMS
SHORTNESS OF BREATH: 0
COUGH: 1
WHEEZING: 0
RHINORRHEA: 0

## 2024-11-11 NOTE — PROGRESS NOTES
11/11/2024    This is a 44 y.o. female   Chief Complaint   Patient presents with    Cough     Still has cough.  Started in late July. Green/yellow mucous. Dizziness if she moves too fast.   .    HPI  Patient reports that since middle of Sept has had cough that has been dry and some mucus production. Denies fever, shortness of breath.   Has taken flonase and allegra and then xyzal without improvement. Also has taken mucinex and Coricidin without improvement in symptoms.    Seen 8/13 for bronchitis and treated with zpak. Felt that symptoms improved after treatment but then returned. Seen on 9/16 and given cefdinir. Reports that symptoms did not improve. Continues to have cough daily and will have mucus production at times.        Patient Active Problem List   Diagnosis    Vitamin D deficiency    Primary hypertension    MS (multiple sclerosis) ([facility])- follows with neurologist Dr. Schwartz in [place], [place]       Current Outpatient Medications   Medication Sig Dispense Refill    amLODIPine (NORVASC) 2.5 MG tablet TAKE 1 TABLET DAILY 90 tablet 1    nadolol (CORGARD) 40 MG tablet TAKE 1 TABLET NIGHTLY 90 tablet 1    sodium chloride 0.9 % SOLN 250 mL with ocrelizumab 300 MG/10ML SOLN 300 mg Infuse intravenously      vitamin D3 (CHOLECALCIFEROL) 125 MCG (5000 UT) TABS tablet Take 1 tablet by mouth three times a week      gabapentin (NEURONTIN) 600 MG tablet Take 1 tablet by mouth 2 times daily.      levonorgestrel (MIRENA) IUD 52 mg 1 each by IntraUTERine route once      nortriptyline (PAMELOR) 75 MG capsule Take 1 capsule by mouth nightly      CALCIUM PO Take 1 capsule by mouth daily      Multiple Vitamin (MULTI-VITAMIN DAILY PO) Take 1 tablet by mouth daily       No current facility-administered medications for this visit.       No Known Allergies    Review of Systems   Constitutional:  Negative for activity change and fever.   HENT:  Negative for congestion, postnasal drip and rhinorrhea.    Respiratory:  Positive for

## 2025-01-21 SDOH — ECONOMIC STABILITY: FOOD INSECURITY: WITHIN THE PAST 12 MONTHS, YOU WORRIED THAT YOUR FOOD WOULD RUN OUT BEFORE YOU GOT MONEY TO BUY MORE.: NEVER TRUE

## 2025-01-21 SDOH — ECONOMIC STABILITY: FOOD INSECURITY: WITHIN THE PAST 12 MONTHS, THE FOOD YOU BOUGHT JUST DIDN'T LAST AND YOU DIDN'T HAVE MONEY TO GET MORE.: NEVER TRUE

## 2025-01-21 SDOH — ECONOMIC STABILITY: INCOME INSECURITY: IN THE LAST 12 MONTHS, WAS THERE A TIME WHEN YOU WERE NOT ABLE TO PAY THE MORTGAGE OR RENT ON TIME?: NO

## 2025-01-21 SDOH — ECONOMIC STABILITY: TRANSPORTATION INSECURITY
IN THE PAST 12 MONTHS, HAS THE LACK OF TRANSPORTATION KEPT YOU FROM MEDICAL APPOINTMENTS OR FROM GETTING MEDICATIONS?: NO

## 2025-01-21 ASSESSMENT — PATIENT HEALTH QUESTIONNAIRE - PHQ9
1. LITTLE INTEREST OR PLEASURE IN DOING THINGS: NOT AT ALL
SUM OF ALL RESPONSES TO PHQ QUESTIONS 1-9: 0
SUM OF ALL RESPONSES TO PHQ9 QUESTIONS 1 & 2: 0
1. LITTLE INTEREST OR PLEASURE IN DOING THINGS: NOT AT ALL
2. FEELING DOWN, DEPRESSED OR HOPELESS: NOT AT ALL
2. FEELING DOWN, DEPRESSED OR HOPELESS: NOT AT ALL
SUM OF ALL RESPONSES TO PHQ9 QUESTIONS 1 & 2: 0

## 2025-01-22 ENCOUNTER — OFFICE VISIT (OUTPATIENT)
Dept: FAMILY MEDICINE CLINIC | Age: 45
End: 2025-01-22

## 2025-01-22 VITALS
SYSTOLIC BLOOD PRESSURE: 118 MMHG | OXYGEN SATURATION: 98 % | BODY MASS INDEX: 23.97 KG/M2 | DIASTOLIC BLOOD PRESSURE: 80 MMHG | HEART RATE: 75 BPM | TEMPERATURE: 97.5 F | WEIGHT: 114.2 LBS | RESPIRATION RATE: 18 BRPM | HEIGHT: 58 IN

## 2025-01-22 DIAGNOSIS — R05.3 CHRONIC COUGH: ICD-10-CM

## 2025-01-22 DIAGNOSIS — I10 PRIMARY HYPERTENSION: ICD-10-CM

## 2025-01-22 DIAGNOSIS — K21.9 GASTROESOPHAGEAL REFLUX DISEASE, UNSPECIFIED WHETHER ESOPHAGITIS PRESENT: ICD-10-CM

## 2025-01-22 DIAGNOSIS — I10 PRIMARY HYPERTENSION: Primary | ICD-10-CM

## 2025-01-22 DIAGNOSIS — G35 MS (MULTIPLE SCLEROSIS) (HCC): ICD-10-CM

## 2025-01-22 LAB
ANION GAP SERPL CALCULATED.3IONS-SCNC: 11 MMOL/L (ref 3–16)
BUN SERPL-MCNC: 7 MG/DL (ref 7–20)
CALCIUM SERPL-MCNC: 9.6 MG/DL (ref 8.3–10.6)
CHLORIDE SERPL-SCNC: 102 MMOL/L (ref 99–110)
CHOLEST SERPL-MCNC: 171 MG/DL (ref 0–199)
CO2 SERPL-SCNC: 27 MMOL/L (ref 21–32)
CREAT SERPL-MCNC: 0.7 MG/DL (ref 0.6–1.1)
GFR SERPLBLD CREATININE-BSD FMLA CKD-EPI: >90 ML/MIN/{1.73_M2}
GLUCOSE SERPL-MCNC: 81 MG/DL (ref 70–99)
HDLC SERPL-MCNC: 53 MG/DL (ref 40–60)
LDL CHOLESTEROL: 101 MG/DL
POTASSIUM SERPL-SCNC: 4.2 MMOL/L (ref 3.5–5.1)
SODIUM SERPL-SCNC: 140 MMOL/L (ref 136–145)
TRIGL SERPL-MCNC: 87 MG/DL (ref 0–150)
VLDLC SERPL CALC-MCNC: 17 MG/DL

## 2025-01-22 RX ORDER — AMLODIPINE BESYLATE 2.5 MG/1
2.5 TABLET ORAL DAILY
Qty: 90 TABLET | Refills: 1 | Status: SHIPPED | OUTPATIENT
Start: 2025-01-22

## 2025-01-22 RX ORDER — PANTOPRAZOLE SODIUM 20 MG/1
20 TABLET, DELAYED RELEASE ORAL
Qty: 30 TABLET | Refills: 0 | Status: SHIPPED | OUTPATIENT
Start: 2025-01-22

## 2025-01-22 RX ORDER — NADOLOL 40 MG/1
40 TABLET ORAL NIGHTLY
Qty: 90 TABLET | Refills: 1 | Status: SHIPPED | OUTPATIENT
Start: 2025-01-22

## 2025-01-22 ASSESSMENT — ENCOUNTER SYMPTOMS
COUGH: 1
ABDOMINAL PAIN: 0
SHORTNESS OF BREATH: 0

## 2025-01-22 NOTE — PROGRESS NOTES
1/22/2025    This is a 44 y.o. female   Chief Complaint   Patient presents with    Hypertension     Has not been checking bp.     Cough     Still has a cough. Has not completed pft.    .    HPI    Hypertension:  Home blood pressure monitoring: No.  She is adherent to a low sodium diet. Patient denies chest pain, shortness of breath, headache, palpitations, and dry cough.  Antihypertensive medication side effects: no medication side effects noted.      On amlodipine 2.5 mg in AM and nadolol 40 mg daily in PM    No results found for: \"LABA1C\"  Lab Results   Component Value Date    CREATININE 0.7 01/30/2024     Lab Results   Component Value Date    ALT 15 07/07/2022    AST 21 07/07/2022     Lab Results   Component Value Date    CHOL 190 01/30/2024    TRIG 65 01/30/2024    HDL 59 01/30/2024        MS- on ocrelizumab infusion every 6 months. Following with neurologist Dr. Schwartz in [place].   Dx 2/2017 with optic neuritis x3 episodes.      Has facial nerve pain and taking nortriptyline 75mg qhs and gabapentin 600 mg BID.     Reports that she continues with a chronic cough. Worse in the morning.  Denies PND, sore throat, sonus congestion, shortness of breath.   Has tried allergy medication and OTC cough medication without improvement in symptoms. Has PFT ordered that she has not yet completed.   Complains of heartburn a couple of times per day. Has not taken anything for symptoms. Reports that she works on her diet. Does not eat spicy or greasy foods. Does not drink alcohol. Will drink 1 cup of coffee per day.        Patient Active Problem List   Diagnosis    Vitamin D deficiency    Primary hypertension    MS (multiple sclerosis) ([facility])- follows with neurologist Dr. Schwartz in [place], [place]          Current Outpatient Medications   Medication Sig Dispense Refill    amLODIPine (NORVASC) 2.5 MG tablet TAKE 1 TABLET DAILY 90 tablet 1    nadolol (CORGARD) 40 MG tablet TAKE 1 TABLET NIGHTLY 90 tablet 1    sodium chloride 0.9

## 2025-02-10 ENCOUNTER — HOSPITAL ENCOUNTER (OUTPATIENT)
Dept: PULMONOLOGY | Age: 45
Discharge: HOME | End: 2025-02-10
Payer: COMMERCIAL

## 2025-02-10 DIAGNOSIS — R05.3 CHRONIC COUGH: ICD-10-CM

## 2025-02-10 LAB
DLCO %PRED: 102 %
DLCO PRED: NORMAL
DLCO/VA %PRED: NORMAL
DLCO/VA PRED: NORMAL
DLCO/VA: NORMAL
DLCO: NORMAL
EXPIRATORY TIME-POST: NORMAL
EXPIRATORY TIME: NORMAL
FEF 25-75 %CHNG: NORMAL
FEF 25-75 POST %PRED: NORMAL
FEF 25-75% %PRED-PRE: NORMAL
FEF 25-75% PRED: NORMAL
FEF 25-75-POST: NORMAL
FEF 25-75-PRE: NORMAL
FEV1 %PRED-POST: 121 %
FEV1 %PRED-PRE: 126 %
FEV1 PRED: NORMAL
FEV1-POST: NORMAL
FEV1-PRE: NORMAL
FEV1/FVC %PRED-POST: NORMAL
FEV1/FVC %PRED-PRE: NORMAL
FEV1/FVC PRED: NORMAL
FEV1/FVC-POST: 82 %
FEV1/FVC-PRE: 82 %
FVC %PRED-POST: 124 L
FVC %PRED-PRE: 129 %
FVC PRED: NORMAL
FVC-POST: 2.73 L
FVC-PRE: 2.85 L
GAW %PRED: NORMAL
GAW PRED: NORMAL
GAW: NORMAL
IC PRE %PRED: NORMAL
IC PRED: NORMAL
IC: NORMAL
MEP: NORMAL
MIP: NORMAL
MVV %PRED-PRE: NORMAL
MVV PRED: NORMAL
MVV-PRE: NORMAL
PEF %PRED-POST: NORMAL
PEF %PRED-PRE: NORMAL
PEF PRED: NORMAL
PEF%CHNG: NORMAL
PEF-POST: NORMAL
PEF-PRE: NORMAL
RAW %PRED: 85 %
RAW PRED: NORMAL
RAW: NORMAL
RV PRE %PRED: 135 %
RV PRED: NORMAL
RV: NORMAL
SVC %PRED: NORMAL
SVC PRED: NORMAL
SVC: NORMAL
TLC PRE %PRED: 128 %
TLC PRED: NORMAL
TLC: NORMAL
VA %PRED: NORMAL
VA PRED: NORMAL
VA: NORMAL
VTG %PRED: 116 %
VTG PRED: NORMAL
VTG: NORMAL

## 2025-02-10 PROCEDURE — 94664 DEMO&/EVAL PT USE INHALER: CPT

## 2025-02-10 PROCEDURE — 94060 EVALUATION OF WHEEZING: CPT

## 2025-02-10 PROCEDURE — 6370000000 HC RX 637 (ALT 250 FOR IP): Performed by: NURSE PRACTITIONER

## 2025-02-10 PROCEDURE — 94726 PLETHYSMOGRAPHY LUNG VOLUMES: CPT

## 2025-02-10 PROCEDURE — 94729 DIFFUSING CAPACITY: CPT

## 2025-02-10 PROCEDURE — 94640 AIRWAY INHALATION TREATMENT: CPT

## 2025-02-10 RX ORDER — ALBUTEROL SULFATE 90 UG/1
4 INHALANT RESPIRATORY (INHALATION) ONCE
Status: COMPLETED | OUTPATIENT
Start: 2025-02-10 | End: 2025-02-10

## 2025-02-10 RX ADMIN — ALBUTEROL SULFATE 4 PUFF: 90 AEROSOL, METERED RESPIRATORY (INHALATION) at 07:24

## 2025-02-10 ASSESSMENT — PULMONARY FUNCTION TESTS
FEV1_PERCENT_PREDICTED_PRE: 126
FVC_PERCENT_PREDICTED_POST: 124
FEV1/FVC_PRE: 82
FVC_PERCENT_PREDICTED_PRE: 129
FVC_POST: 2.73
FEV1/FVC_POST: 82
FEV1_PERCENT_PREDICTED_POST: 121
FVC_PRE: 2.85

## 2025-02-11 NOTE — PROCEDURES
PROCEDURE NOTE  Date: 2/11/2025   Name: Kelly Otero  YOB: 1980        Spirometry was acceptable and reproducible by ATS standards      Spirometry/Flow volume loop:  Spirometry and flow volume loops do not show airflow obstruction. FEV1 121%.  %There is no bronchodilator response.    Lung volumes:  Lung volumes with hyperinflation, there is also air trapping.      Diffusing capacity:  DLCO is normal.          Summary:  Isolated air trapping and hyperinflation.  Cannot rule out obstructive airways disease      FEV1 %Pred-Post   Date Value Ref Range Status   02/10/2025 121 % Final     FEV1/FVC-Post   Date Value Ref Range Status   02/10/2025 82 % Final     TLC Pre %Pred   Date Value Ref Range Status   02/10/2025 128 % Final     DLCO %Pred   Date Value Ref Range Status   02/10/2025 102 % Final       PFT data will be scanned into the media tab under this encounter. Please see the scanned data for numerical values.     Sourav Schwartz MD  [facility] Pulmonary, Sleep and Critical Care Medicine

## 2025-02-26 ENCOUNTER — OFFICE VISIT (OUTPATIENT)
Dept: FAMILY MEDICINE CLINIC | Age: 45
End: 2025-02-26

## 2025-02-26 VITALS
BODY MASS INDEX: 24.27 KG/M2 | WEIGHT: 115.6 LBS | HEART RATE: 81 BPM | OXYGEN SATURATION: 98 % | RESPIRATION RATE: 20 BRPM | HEIGHT: 58 IN | TEMPERATURE: 98.3 F | SYSTOLIC BLOOD PRESSURE: 116 MMHG | DIASTOLIC BLOOD PRESSURE: 76 MMHG

## 2025-02-26 DIAGNOSIS — I10 PRIMARY HYPERTENSION: Primary | ICD-10-CM

## 2025-02-26 DIAGNOSIS — R05.3 CHRONIC COUGH: ICD-10-CM

## 2025-02-26 DIAGNOSIS — G35 MS (MULTIPLE SCLEROSIS) (HCC): ICD-10-CM

## 2025-02-26 DIAGNOSIS — K21.9 GASTROESOPHAGEAL REFLUX DISEASE, UNSPECIFIED WHETHER ESOPHAGITIS PRESENT: ICD-10-CM

## 2025-02-26 ASSESSMENT — ENCOUNTER SYMPTOMS
SHORTNESS OF BREATH: 0
ABDOMINAL PAIN: 0
COUGH: 1

## 2025-02-26 NOTE — PROGRESS NOTES
2/26/2025    This is a 44 y.o. female   Chief Complaint   Patient presents with    Cough     Cough has been the same. Yellow mucous.   .    HPI    Hypertension:  Home blood pressure monitoring: No.  She is adherent to a low sodium diet. Patient denies chest pain, shortness of breath, headache, palpitations, and dry cough.  Antihypertensive medication side effects: no medication side effects noted.      On amlodipine 2.5 mg in AM and nadolol 40 mg daily in PM    No results found for: \"LABA1C\"  Lab Results   Component Value Date    CREATININE 0.7 01/22/2025     Lab Results   Component Value Date    ALT 15 07/07/2022    AST 21 07/07/2022     Lab Results   Component Value Date    CHOL 190 01/30/2024    TRIG 65 01/30/2024    HDL 53 01/22/2025        MS- on ocrelizumab infusion every 6 months. Following with neurologist Dr. Schwartz in [place].   Dx 2/2017 with optic neuritis x3 episodes.      Has facial nerve pain and taking nortriptyline 75mg qhs and gabapentin 600 mg BID.     Reports that she continues with a chronic cough. Developed after having bronchitis in Aug 2014. Cough is worse in the morning.  Denies PND, sore throat, sinus congestion, shortness of breath.   Has tried allergy medication and OTC cough medication without improvement in symptoms.   PFT completed 2/10/25 showed isolated air trapping and hyperinflation.   Complains of heartburn a couple of times per day. Last visit started on pantoprazole 20 mg daily. Reports that she works on her diet. Does not eat spicy or greasy foods. Reports that cough did not improve with the pantoprazole. She is not sure if her heartburn improved on medication, would like to stay off medication.   Reports that she talked with her neurologist and neurologist did not think that cough could be r/t to her ocrelizumab.        Patient Active Problem List   Diagnosis    Vitamin D deficiency    Primary hypertension    MS (multiple sclerosis) ([facility])- follows with neurologist

## 2025-02-27 ENCOUNTER — OFFICE VISIT (OUTPATIENT)
Dept: PULMONOLOGY | Age: 45
End: 2025-02-27
Payer: COMMERCIAL

## 2025-02-27 VITALS
HEART RATE: 76 BPM | RESPIRATION RATE: 18 BRPM | BODY MASS INDEX: 24.39 KG/M2 | DIASTOLIC BLOOD PRESSURE: 71 MMHG | OXYGEN SATURATION: 100 % | SYSTOLIC BLOOD PRESSURE: 145 MMHG | HEIGHT: 58 IN | WEIGHT: 116.2 LBS | TEMPERATURE: 97.2 F

## 2025-02-27 DIAGNOSIS — J30.2 SEASONAL ALLERGIES: ICD-10-CM

## 2025-02-27 DIAGNOSIS — R05.3 CHRONIC COUGH: ICD-10-CM

## 2025-02-27 DIAGNOSIS — R05.3 CHRONIC COUGH: Primary | ICD-10-CM

## 2025-02-27 PROCEDURE — 3077F SYST BP >= 140 MM HG: CPT | Performed by: INTERNAL MEDICINE

## 2025-02-27 PROCEDURE — 3078F DIAST BP <80 MM HG: CPT | Performed by: INTERNAL MEDICINE

## 2025-02-27 PROCEDURE — 99204 OFFICE O/P NEW MOD 45 MIN: CPT | Performed by: INTERNAL MEDICINE

## 2025-02-27 RX ORDER — ALBUTEROL SULFATE 90 UG/1
2 INHALANT RESPIRATORY (INHALATION) EVERY 6 HOURS PRN
Qty: 17 G | Refills: 5 | Status: SHIPPED | OUTPATIENT
Start: 2025-02-27

## 2025-02-27 ASSESSMENT — ENCOUNTER SYMPTOMS
WHEEZING: 0
COUGH: 1
HEARTBURN: 1
SHORTNESS OF BREATH: 0

## 2025-02-27 NOTE — PROGRESS NOTES
[facility] PULMONARY, SLEEP, AND CRITICAL CARE    Kelly Otero (:  1980) is a 44 y.o. female,New patient, here for evaluation of the following chief complaint(s):  New Patient and Cough (Chronic cough w/phlegm with cough for 2 mo.)      Assessment & Plan   ASSESSMENT/PLAN:  1. Chronic cough  Assessment & Plan:  -Could be GERD versus postnasal drip versus asthma  -PFTs with air trapping and hyperinflation agree with bronchodilator therapy will provide a rescue inhaler  -Start ICS when available  -Check respiratory allergy profile  Orders:  -     albuterol sulfate HFA (PROVENTIL;VENTOLIN;PROAIR) 108 (90 Base) MCG/ACT inhaler; Inhale 2 puffs into the lungs every 6 hours as needed for Wheezing or Shortness of Breath, Disp-17 g, R-5Normal  -     Allergen, Respiratory, Region 5 Panel; Future  2. Seasonal allergies  -     Allergen, Respiratory, Region 5 Panel; Future      Return in about 6 weeks (around 4/10/2025).  Future Appointments   Date Time Provider Department Center   4/15/2025 11:40 AM [facility]   2025  8:00 AM Millicent Perez, APRN - CNP [facility]            Subjective   SUBJECTIVE/OBJECTIVE:  44-year-old never smoker here for chronic cough.    PFTs with some air trapping and hyperinflation    Cough  This is a chronic problem. The current episode started more than 1 month ago. The problem has been unchanged. The problem occurs every few minutes. The cough is Productive of sputum. Associated symptoms include heartburn, nasal congestion and postnasal drip. Pertinent negatives include no fever, shortness of breath or wheezing. The symptoms are aggravated by lying down. She has tried OTC cough suppressant for the symptoms. The treatment provided no relief.       Review of Systems   Constitutional:  Negative for fever.   HENT:  Positive for postnasal drip.    Respiratory:  Positive for cough. Negative for shortness of breath and wheezing.

## 2025-02-27 NOTE — ASSESSMENT & PLAN NOTE
-Could be GERD versus postnasal drip versus asthma  -PFTs with air trapping and hyperinflation agree with bronchodilator therapy will provide a rescue inhaler  -Start ICS when available  -Check respiratory allergy profile

## 2025-03-03 LAB
A ALTERNATA IGE QN: <0.1 KU/L (ref 0–0.34)
A FUMIGATUS IGE QN: <0.1 KU/L (ref 0–0.34)
AMER SYCAMORE IGE QN: <0.1 KU/L (ref 0–0.34)
BERMUDA GRASS IGE QN: <0.1 KU/L (ref 0–0.34)
BOXELDER IGE QN: <0.1 KU/L (ref 0–0.34)
C SPHAEROSPERMUM IGE QN: <0.1 KUL/L (ref 0–0.34)
CALIF WALNUT IGE QN: <0.1 KU/L (ref 0–0.34)
CAT DANDER IGE QN: 0.22 KU/L (ref 0–0.34)
CMN PIGWEED IGE QN: <0.1 KU/L (ref 0–0.34)
COMMON RAGWEED IGE QN: 0.11 KU/L (ref 0–0.34)
COTTONWOOD IGE QN: <0.1 KU/L (ref 0–0.34)
D FARINAE IGE QN: <0.1 KU/L (ref 0–0.34)
D PTERONYSS IGE QN: <0.1 KU/L (ref 0–0.34)
DOG DANDER IGE QN: <0.1 KU/L (ref 0–0.34)
IGE SERPL-ACNC: 2 IU/ML (ref 0–100)
M RACEMOSUS IGE QN: <0.1 KU/L (ref 0–0.34)
MOUSE EPITH IGE QN: <0.1 KU/L (ref 0–0.34)
P NOTATUM IGE QN: <0.1 KU/L (ref 0–0.34)
PECAN/HICK TREE IGE QN: <0.1 KU/L (ref 0–0.34)
RED CEDAR IGE QN: <0.1 KU/L (ref 0–0.34)
ROACH IGE QN: <0.1 KU/L (ref 0–0.34)
SALTWORT IGE QN: <0.1 KU/L (ref 0–0.34)
SHEEP SORREL IGE QN: <0.1 KU/L (ref 0–0.34)
SILVER BIRCH IGE QN: <0.1 KU/L (ref 0–0.34)
TIMOTHY IGE QN: <0.1 KU/L (ref 0–0.34)
WHITE ASH IGE QN: <0.1 KU/L (ref 0–0.34)
WHITE ELM IGE QN: <0.1 KU/L (ref 0–0.34)
WHITE MULBERRY IGE QN: <0.1 KU/L (ref 0–0.34)
WHITE OAK IGE QN: <0.1 KU/L (ref 0–0.34)

## 2025-04-18 ENCOUNTER — OFFICE VISIT (OUTPATIENT)
Dept: PULMONOLOGY | Age: 45
End: 2025-04-18
Payer: COMMERCIAL

## 2025-04-18 VITALS
OXYGEN SATURATION: 98 % | BODY MASS INDEX: 24.01 KG/M2 | HEART RATE: 79 BPM | WEIGHT: 114.4 LBS | HEIGHT: 58 IN | DIASTOLIC BLOOD PRESSURE: 80 MMHG | TEMPERATURE: 98.4 F | RESPIRATION RATE: 18 BRPM | SYSTOLIC BLOOD PRESSURE: 110 MMHG

## 2025-04-18 DIAGNOSIS — R05.3 CHRONIC COUGH: Primary | ICD-10-CM

## 2025-04-18 PROCEDURE — 3079F DIAST BP 80-89 MM HG: CPT | Performed by: INTERNAL MEDICINE

## 2025-04-18 PROCEDURE — 3074F SYST BP LT 130 MM HG: CPT | Performed by: INTERNAL MEDICINE

## 2025-04-18 PROCEDURE — 99214 OFFICE O/P EST MOD 30 MIN: CPT | Performed by: INTERNAL MEDICINE

## 2025-04-18 ASSESSMENT — ENCOUNTER SYMPTOMS
COUGH: 1
WHEEZING: 0
HEARTBURN: 1
SHORTNESS OF BREATH: 0

## 2025-04-18 NOTE — PROGRESS NOTES
[facility] PULMONARY, SLEEP, AND CRITICAL CARE    Kelly Otero (:  1980) is a 44 y.o. female,New patient, here for evaluation of the following chief complaint(s):  Cough      Assessment & Plan   ASSESSMENT/PLAN:  1. Chronic cough  Assessment & Plan:  - Chronic, suboptimal control  - Possible cough variant asthma  - Get symptomatic relief with ICS will trial triple inhaler with sample.  If there is symptomatic improvement beyond what she has may benefit from ICS/LABA.  Otherwise instructed patient just to go back to Qvar twice daily  - Has albuterol as needed  - Respiratory allergy profile negative  - May still benefit from intranasal steroid/antihistamine if needed        Return in about 6 months (around 10/18/2025).  Future Appointments   Date Time Provider Department Center   2025  8:00 AM Millicent Perez, PRUDENCIO - CNP [facility]   10/28/2025  1:40 PM Sourav Schwartz MD  PUL[facility]            Subjective   SUBJECTIVE/OBJECTIVE:  44-year-old never smoker presents for follow-up of chronic cough.    PFTs with some air trapping and hyperinflation.  Started on Qvar, since last appointment cough persists but is dramatically improved.  Some lingering early morning productive cough.    No symptomatic improvement with antireflux medications    Respiratory allergy profile negative    Cough  This is a chronic problem. The current episode started more than 1 month ago. The problem has been unchanged. The problem occurs every few minutes. The cough is Productive of sputum. Associated symptoms include heartburn, nasal congestion and postnasal drip. Pertinent negatives include no fever, shortness of breath or wheezing. The symptoms are aggravated by lying down. She has tried OTC cough suppressant for the symptoms. The treatment provided no relief.       Review of Systems   Constitutional:  Negative for fever.   HENT:  Positive for postnasal drip.    Respiratory:  Positive for cough.

## 2025-04-18 NOTE — ASSESSMENT & PLAN NOTE
- Chronic, suboptimal control  - Possible cough variant asthma  - Get symptomatic relief with ICS will trial triple inhaler with sample.  If there is symptomatic improvement beyond what she has may benefit from ICS/LABA.  Otherwise instructed patient just to go back to Qvar twice daily  - Has albuterol as needed  - Respiratory allergy profile negative  - May still benefit from intranasal steroid/antihistamine if needed

## 2025-05-06 ENCOUNTER — OFFICE VISIT (OUTPATIENT)
Dept: FAMILY MEDICINE CLINIC | Age: 45
End: 2025-05-06

## 2025-05-06 VITALS
TEMPERATURE: 97.9 F | BODY MASS INDEX: 23.76 KG/M2 | SYSTOLIC BLOOD PRESSURE: 124 MMHG | WEIGHT: 113.2 LBS | HEART RATE: 61 BPM | RESPIRATION RATE: 20 BRPM | OXYGEN SATURATION: 99 % | DIASTOLIC BLOOD PRESSURE: 82 MMHG | HEIGHT: 58 IN

## 2025-05-06 DIAGNOSIS — K21.9 GASTROESOPHAGEAL REFLUX DISEASE, UNSPECIFIED WHETHER ESOPHAGITIS PRESENT: ICD-10-CM

## 2025-05-06 DIAGNOSIS — I10 PRIMARY HYPERTENSION: ICD-10-CM

## 2025-05-06 DIAGNOSIS — R05.3 CHRONIC COUGH: Primary | ICD-10-CM

## 2025-05-06 DIAGNOSIS — G35 MS (MULTIPLE SCLEROSIS) (HCC): ICD-10-CM

## 2025-05-06 RX ORDER — PANTOPRAZOLE SODIUM 20 MG/1
20 TABLET, DELAYED RELEASE ORAL
Qty: 30 TABLET | Refills: 2 | Status: SHIPPED | OUTPATIENT
Start: 2025-05-06

## 2025-05-06 RX ORDER — LAMOTRIGINE 25 MG/1
50 TABLET ORAL 2 TIMES DAILY
COMMUNITY
Start: 2025-05-06

## 2025-05-06 RX ORDER — GABAPENTIN 300 MG/1
300 CAPSULE ORAL EVERY MORNING
COMMUNITY

## 2025-05-06 ASSESSMENT — ENCOUNTER SYMPTOMS
COUGH: 1
ABDOMINAL PAIN: 0
SHORTNESS OF BREATH: 0

## 2025-05-06 NOTE — PROGRESS NOTES
5/6/2025    This is a 44 y.o. female   Chief Complaint   Patient presents with    Cough     Cough is better. Some mornings are worse.    Hypertension     Bp has been good.    .    HPI    Hypertension:  Home blood pressure monitoring: No.  She is adherent to a low sodium diet. Patient denies chest pain, shortness of breath, headache, palpitations, and dry cough.  Antihypertensive medication side effects: no medication side effects noted.      On amlodipine 2.5 mg in AM and nadolol 40 mg daily in PM    No results found for: \"LABA1C\"  Lab Results   Component Value Date    CREATININE 0.7 01/22/2025     Lab Results   Component Value Date    ALT 15 07/07/2022    AST 21 07/07/2022     Lab Results   Component Value Date    CHOL 190 01/30/2024    TRIG 65 01/30/2024    HDL 53 01/22/2025        MS- on ocrelizumab infusion every 6 months. Following with neurologist Dr. Schwartz in [place].   Dx 2/2017 with optic neuritis x3 episodes.      Has facial nerve pain and taking nortriptyline 75mg qhs and gabapentin 600 mg BID. Recently started on lamictal and plans to reduce gabapentin dose.     Reports that she continues with a chronic cough. Developed after having bronchitis in Aug 2014. Cough is worse in the morning.  Denies PND, sore throat, sinus congestion, shortness of breath.   Has tried allergy medication and OTC cough medication without improvement in symptoms.   PFT completed 2/10/25 showed isolated air trapping and hyperinflation.   Complains of heartburn a couple of times per day. Treatment with a PPI did not help improve the cough. She would like to restart the pantoprazole as she reports that her heartburn was better controlled with medication.   Reports that she talked with her neurologist and neurologist did not think that cough could be r/t to her ocrelizumab.   Reports that cough has improved with the qvar. Had evaluation with Dr. Schwartz and he tried her on breztri but she reports worsening cough with that

## 2025-07-11 ENCOUNTER — OFFICE VISIT (OUTPATIENT)
Dept: FAMILY MEDICINE CLINIC | Age: 45
End: 2025-07-11

## 2025-07-11 VITALS
HEART RATE: 76 BPM | TEMPERATURE: 98.1 F | RESPIRATION RATE: 18 BRPM | WEIGHT: 112.6 LBS | BODY MASS INDEX: 23.63 KG/M2 | HEIGHT: 58 IN | DIASTOLIC BLOOD PRESSURE: 80 MMHG | SYSTOLIC BLOOD PRESSURE: 122 MMHG | OXYGEN SATURATION: 100 %

## 2025-07-11 DIAGNOSIS — R05.3 CHRONIC COUGH: ICD-10-CM

## 2025-07-11 DIAGNOSIS — R42 DIZZINESS: ICD-10-CM

## 2025-07-11 DIAGNOSIS — R49.0 HOARSENESS OF VOICE: Primary | ICD-10-CM

## 2025-07-11 RX ORDER — MECLIZINE HYDROCHLORIDE 25 MG/1
25 TABLET ORAL 3 TIMES DAILY PRN
Qty: 30 TABLET | Refills: 0 | Status: SHIPPED | OUTPATIENT
Start: 2025-07-11 | End: 2025-07-21

## 2025-07-11 ASSESSMENT — ENCOUNTER SYMPTOMS
SINUS PRESSURE: 0
SINUS PAIN: 0
RHINORRHEA: 0
VOICE CHANGE: 1
TROUBLE SWALLOWING: 0
SHORTNESS OF BREATH: 0
COUGH: 1
WHEEZING: 0
SORE THROAT: 0

## 2025-07-11 NOTE — PROGRESS NOTES
(36.7 °C)   TempSrc: Oral   SpO2: 100%   Weight: 51.1 kg (112 lb 9.6 oz)   Height: 1.473 m (4' 9.99\")       Body mass index is 23.54 kg/m².     Wt Readings from Last 3 Encounters:   07/11/25 51.1 kg (112 lb 9.6 oz)   05/06/25 51.3 kg (113 lb 3.2 oz)   04/18/25 51.9 kg (114 lb 6.4 oz)       BP Readings from Last 3 Encounters:   07/11/25 122/80   05/06/25 124/82   04/18/25 110/80       Physical Exam  Vitals and nursing note reviewed.   Constitutional:       General: She is not in acute distress.     Appearance: She is well-developed.   HENT:      Head: Normocephalic and atraumatic.      Right Ear: Tympanic membrane, ear canal and external ear normal.      Left Ear: Tympanic membrane, ear canal and external ear normal.      Mouth/Throat:      Pharynx: Oropharynx is clear. Uvula midline. No posterior oropharyngeal erythema.   Cardiovascular:      Rate and Rhythm: Normal rate and regular rhythm.      Heart sounds: Normal heart sounds. No murmur heard.     No friction rub. No gallop.   Pulmonary:      Effort: Pulmonary effort is normal. No respiratory distress.      Breath sounds: Normal breath sounds.   Musculoskeletal:      Cervical back: Neck supple.   Skin:     General: Skin is warm and dry.   Neurological:      Mental Status: She is alert and oriented to person, place, and time.   Psychiatric:         Behavior: Behavior normal.         Thought Content: Thought content normal.         Judgment: Judgment normal.         Assessmentand Plan  Kelly was seen today for cough.    Diagnoses and all orders for this visit:    Hoarseness of voice  -     Melissa - Zeke Ovalle MD, Otolaryngology, [facility]    Chronic cough    Dizziness  -     meclizine (ANTIVERT) 25 MG tablet; Take 1 tablet by mouth 3 times daily as needed for Dizziness      Assessment & Plan  1. Chronic cough.  - Continue using Qvar, gargle with warm salt water to clear the back of the throat, and rinse the mouth after using Qvar. Referral to ENT

## 2025-07-12 DIAGNOSIS — R05.3 CHRONIC COUGH: ICD-10-CM

## 2025-07-24 ENCOUNTER — OFFICE VISIT (OUTPATIENT)
Dept: ENT CLINIC | Age: 45
End: 2025-07-24
Payer: COMMERCIAL

## 2025-07-24 VITALS
DIASTOLIC BLOOD PRESSURE: 95 MMHG | HEIGHT: 58 IN | BODY MASS INDEX: 23.54 KG/M2 | HEART RATE: 76 BPM | SYSTOLIC BLOOD PRESSURE: 149 MMHG

## 2025-07-24 DIAGNOSIS — R05.3 CHRONIC COUGH: Primary | ICD-10-CM

## 2025-07-24 PROCEDURE — 31575 DIAGNOSTIC LARYNGOSCOPY: CPT | Performed by: OTOLARYNGOLOGY

## 2025-07-24 PROCEDURE — 3077F SYST BP >= 140 MM HG: CPT | Performed by: OTOLARYNGOLOGY

## 2025-07-24 PROCEDURE — 3080F DIAST BP >= 90 MM HG: CPT | Performed by: OTOLARYNGOLOGY

## 2025-07-24 PROCEDURE — 99203 OFFICE O/P NEW LOW 30 MIN: CPT | Performed by: OTOLARYNGOLOGY

## 2025-07-24 NOTE — PROGRESS NOTES
[facility]  DIVISION OF OTOLARYNGOLOGY- HEAD & NECK SURGERY  CONSULT      Patient Name: Kelly Otero  Medical Record Number:  [id number]  Primary Care Physician:  Millicent Perez, PRUDENCIO - CNP  Date of Consultation: 7/24/2025    Chief Complaint: Cough        HISTORY OF PRESENT ILLNESS  Kelly is a(n) 44 y.o. female who presents for evaluation of a chronic cough.  She has had this for about a year.  She has had a fairly extensive workup.  She is not a smoker.  She has seen a pulmonologist who started her on a Qvar inhaler.  She has not had thrush.  She also has taken Protonix, but only 20 mg.  She clears her throat a lot.  She does have MS and gets an infusion with ocrelizumab.            REVIEW OF SYSTEMS  As above    PHYSICAL EXAM  GENERAL: No Acute Distress, Alert and Oriented, no Hoarseness, strong voice  EYES: EOMI, Anti-icteric  HENT:   Head: Normocephalic and atraumatic.   Face:  Symmetric, facial nerve intact, no sinus tenderness  Right Ear: Normal external ear, normal external auditory canal, intact tympanic membrane with normal mobility and aerated middle ear  Left Ear: Normal external ear, normal external auditory canal, intact tympanic membrane with normal mobility and aerated middle ear  Mouth/Oral Cavity:  normal lips, Uvula is midline, no mucosal lesions, no trismus,  Oropharynx/Larynx:  normal oropharynx,   Nose:Normal external nasal appearance.  A  NECK: Normal range of motion, no thyromegaly, trachea is midline, no lymphadenopathy, no neck masses, no crepitus            PROCEDURE  Flexible laryngoscopy  Afrin and lidocaine were applied the nasal cavity.  A flexible scope was passed the nasal cavity.  The nasopharynx was normal.  Base of tongue vallecula is normal.  She had some mild interarytenoid edema consistent with reflux with no other concerning mass or lesion.        ASSESSMENT/PLAN  1. Chronic cough  She has had a fairly extensive workup.  She is already seeing pulmonology.  I felt as

## 2025-08-07 ENCOUNTER — PROCEDURE VISIT (OUTPATIENT)
Dept: SPEECH THERAPY | Age: 45
End: 2025-08-07
Payer: COMMERCIAL

## 2025-08-07 DIAGNOSIS — J38.7 IRRITABLE LARYNX: ICD-10-CM

## 2025-08-07 DIAGNOSIS — K21.9 LARYNGOPHARYNGEAL REFLUX (LPR): ICD-10-CM

## 2025-08-07 DIAGNOSIS — R49.0 DYSPHONIA: Primary | ICD-10-CM

## 2025-08-07 DIAGNOSIS — R05.3 CHRONIC COUGH: ICD-10-CM

## 2025-08-07 PROCEDURE — 92524 BEHAVRAL QUALIT ANALYS VOICE: CPT | Performed by: SPEECH-LANGUAGE PATHOLOGIST

## 2025-08-07 PROCEDURE — 92507 TX SP LANG VOICE COMM INDIV: CPT | Performed by: SPEECH-LANGUAGE PATHOLOGIST

## 2025-08-07 PROCEDURE — 31579 LARYNGOSCOPY TELESCOPIC: CPT | Performed by: SPEECH-LANGUAGE PATHOLOGIST

## 2025-08-21 DIAGNOSIS — I10 PRIMARY HYPERTENSION: ICD-10-CM

## 2025-08-21 RX ORDER — NADOLOL 40 MG/1
40 TABLET ORAL NIGHTLY
Qty: 90 TABLET | Refills: 1 | Status: SHIPPED | OUTPATIENT
Start: 2025-08-21

## 2025-08-26 DIAGNOSIS — I10 PRIMARY HYPERTENSION: ICD-10-CM

## 2025-08-26 RX ORDER — AMLODIPINE BESYLATE 2.5 MG/1
2.5 TABLET ORAL DAILY
Qty: 90 TABLET | Refills: 1 | Status: SHIPPED | OUTPATIENT
Start: 2025-08-26

## 2025-08-29 ENCOUNTER — CLINICAL DOCUMENTATION (OUTPATIENT)
Dept: SPEECH THERAPY | Age: 45
End: 2025-08-29